# Patient Record
Sex: FEMALE | Race: WHITE | NOT HISPANIC OR LATINO | URBAN - NONMETROPOLITAN AREA
[De-identification: names, ages, dates, MRNs, and addresses within clinical notes are randomized per-mention and may not be internally consistent; named-entity substitution may affect disease eponyms.]

---

## 2017-05-22 PROBLEM — Z96.1: Noted: 2017-05-22

## 2017-05-22 PROBLEM — H52.4: Noted: 2017-05-22

## 2019-07-12 ENCOUNTER — IMPORTED ENCOUNTER (OUTPATIENT)
Dept: URBAN - NONMETROPOLITAN AREA CLINIC 1 | Facility: CLINIC | Age: 74
End: 2019-07-12

## 2019-07-12 PROCEDURE — 92014 COMPRE OPH EXAM EST PT 1/>: CPT

## 2019-07-12 PROCEDURE — 92015 DETERMINE REFRACTIVE STATE: CPT

## 2019-07-12 NOTE — PATIENT DISCUSSION
PVD OU Discussed findings of exam in detail with the patient. The risk of retinal detachment in patients with PVDs was discussed with the patient and the warning signs of retinal detachment were carefully reviewed with the patient. The patient was warned to return to the office or contact the ophthalmologist on call immediately if they experience signs of retinal detachment. Continue to monitor. Pseudophakia OUDiscussed diagnosis in detail with patient. Both intraocular implants in place and stable. Continue to monitor. Increased IOP OSDiscussed diagnosis in detail with patient. IOP at 26 OS. RTC in 1 month for IOP check Presbyopia OUDicussed refractive status in detail with patient. New glasses Rx given today. Continue to monitor.; 's Notes: MR 7/12/19DFE 7/12/19

## 2019-08-16 ENCOUNTER — IMPORTED ENCOUNTER (OUTPATIENT)
Dept: URBAN - NONMETROPOLITAN AREA CLINIC 1 | Facility: CLINIC | Age: 74
End: 2019-08-16

## 2019-08-16 PROBLEM — Z96.1: Noted: 2019-08-16

## 2019-08-16 PROBLEM — H52.4: Noted: 2019-08-16

## 2019-08-16 PROBLEM — H04.123: Noted: 2019-08-16

## 2019-08-16 PROBLEM — H43.813: Noted: 2019-08-16

## 2019-08-16 PROCEDURE — 92012 INTRM OPH EXAM EST PATIENT: CPT

## 2019-08-16 NOTE — PATIENT DISCUSSION
GEORGETTE OUDiscussed diagnosis in detail with patient. 1+SPK noted OU. Start Refresh BID-QID OU samples given today. Continue to monitor. PVD OU Discussed findings of exam in detail with the patient. The risk of retinal detachment in patients with PVDs was discussed with the patient and the warning signs of retinal detachment were carefully reviewed with the patient. The patient was warned to return to the office or contact the ophthalmologist on call immediately if they experience signs of retinal detachment. Continue to monitor. Pseudophakia OUDiscussed diagnosis in detail with patient. Both intraocular implants in place and stable. Continue to monitor. Increased IOP OSDiscussed diagnosis in detail with patient. IOP better at 19 OS todayPresbyopia OUDicussed refractive status in detail with patient. Continue to monitor.; 's Notes: MR 7/12/19DFE 7/12/19

## 2020-08-21 ENCOUNTER — IMPORTED ENCOUNTER (OUTPATIENT)
Dept: URBAN - NONMETROPOLITAN AREA CLINIC 1 | Facility: CLINIC | Age: 75
End: 2020-08-21

## 2020-08-21 PROBLEM — H43.813: Noted: 2020-08-21

## 2020-08-21 PROBLEM — Z96.1: Noted: 2019-08-16

## 2020-08-21 PROBLEM — H04.123: Noted: 2020-08-21

## 2020-08-21 PROBLEM — H52.4: Noted: 2019-08-16

## 2020-08-21 PROCEDURE — 92014 COMPRE OPH EXAM EST PT 1/>: CPT

## 2020-08-21 PROCEDURE — 92250 FUNDUS PHOTOGRAPHY W/I&R: CPT

## 2020-08-21 PROCEDURE — 92015 DETERMINE REFRACTIVE STATE: CPT

## 2020-08-21 NOTE — PATIENT DISCUSSION
PVD OU Discussed findings of exam in detail with the patient. The risk of retinal detachment in patients with PVDs was discussed with the patient and the warning signs of retinal detachment were carefully reviewed with the patient. The patient was warned to return to the office or contact the ophthalmologist on call immediately if they experience signs of retinal detachment. Optos done today; no holes tears or detachments OU. Continue to monitor. GEORGETTE OUDiscussed diagnosis in detail with patient. Trace SPK noted OU. Start Refresh Relieva PRN samples given today. Continue to monitor. Pseudophakia OUDiscussed diagnosis in detail with patient. Both intraocular implants in place and stable. Continue to monitor. Presbyopia OUDicussed refractive status in detail with patient. New glassses Rx given today. Continue to monitor.; 's Notes: MR 8/21/20DFE 7/12/19OPTOS 8/21/20

## 2021-08-23 ENCOUNTER — IMPORTED ENCOUNTER (OUTPATIENT)
Dept: URBAN - NONMETROPOLITAN AREA CLINIC 1 | Facility: CLINIC | Age: 76
End: 2021-08-23

## 2021-08-23 PROCEDURE — 99214 OFFICE O/P EST MOD 30 MIN: CPT

## 2021-08-23 PROCEDURE — 92015 DETERMINE REFRACTIVE STATE: CPT

## 2021-08-23 PROCEDURE — 92250 FUNDUS PHOTOGRAPHY W/I&R: CPT

## 2021-08-23 NOTE — PATIENT DISCUSSION
PVD OU Discussed findings of exam in detail with the patient. The risk of retinal detachment in patients with PVDs was discussed with the patient and the warning signs of retinal detachment were carefully reviewed with the patient. The patient was warned to return to the office or contact the ophthalmologist on call immediately if they experience signs of retinal detachment. Optos done today; no holes tears or detachments OU. Continue to monitor. GEORGETTE OUDiscussed diagnosis in detail with patient. Trace SPK noted OU. Continue Refresh Relieva PRN samples given today. Continue to monitor. Pseudophakia OUDiscussed diagnosis in detail with patient. Both intraocular implants in place and stable. Continue to monitor. Presbyopia OUDicussed refractive status in detail with patient. New glassses Rx given today. Continue to monitor.; 's Notes: MR 8/21/20DFE 7/12/19OPTOS 8/21/20

## 2022-04-15 ASSESSMENT — VISUAL ACUITY
OS_CC: 20/20-2
OS_SC: 20/30
OS_SC: 20/25-2
OD_CC: J1+
OD_SC: 20/25-2
OD_SC: 20/30-
OD_CC: 20/25-2
OS_CC: J1+
OD_CC: 20/30-1
OS_CC: 20/40-1

## 2022-04-15 ASSESSMENT — TONOMETRY
OD_IOP_MMHG: 17
OS_IOP_MMHG: 16
OS_IOP_MMHG: 26
OD_IOP_MMHG: 16
OS_IOP_MMHG: 19
OS_IOP_MMHG: 16

## 2022-09-08 ENCOUNTER — FOLLOW UP (OUTPATIENT)
Dept: URBAN - NONMETROPOLITAN AREA CLINIC 1 | Facility: CLINIC | Age: 77
End: 2022-09-08

## 2022-09-08 DIAGNOSIS — H04.123: ICD-10-CM

## 2022-09-08 DIAGNOSIS — H43.813: ICD-10-CM

## 2022-09-08 DIAGNOSIS — H52.4: ICD-10-CM

## 2022-09-08 PROCEDURE — 99214 OFFICE O/P EST MOD 30 MIN: CPT

## 2022-09-08 PROCEDURE — 92015 DETERMINE REFRACTIVE STATE: CPT

## 2022-09-08 PROCEDURE — 92250 FUNDUS PHOTOGRAPHY W/I&R: CPT

## 2022-09-08 ASSESSMENT — TONOMETRY
OS_IOP_MMHG: 16
OD_IOP_MMHG: 16

## 2022-09-08 ASSESSMENT — VISUAL ACUITY
OD_CC: 20/22-1
OS_CC: 20/32

## 2023-09-11 ENCOUNTER — ESTABLISHED PATIENT (OUTPATIENT)
Dept: URBAN - NONMETROPOLITAN AREA CLINIC 1 | Facility: CLINIC | Age: 78
End: 2023-09-11

## 2023-09-11 DIAGNOSIS — H04.123: ICD-10-CM

## 2023-09-11 DIAGNOSIS — H43.813: ICD-10-CM

## 2023-09-11 PROCEDURE — 99214 OFFICE O/P EST MOD 30 MIN: CPT

## 2023-09-11 PROCEDURE — 92250 FUNDUS PHOTOGRAPHY W/I&R: CPT

## 2023-09-11 ASSESSMENT — TONOMETRY
OS_IOP_MMHG: 17
OD_IOP_MMHG: 17

## 2023-09-11 ASSESSMENT — VISUAL ACUITY
OU_CC: 20/25
OD_CC: 20/30

## 2024-07-08 ENCOUNTER — EMERGENCY VISIT (OUTPATIENT)
Dept: URBAN - NONMETROPOLITAN AREA CLINIC 1 | Facility: CLINIC | Age: 79
End: 2024-07-08

## 2024-07-08 DIAGNOSIS — H20.012: ICD-10-CM

## 2024-07-08 PROCEDURE — 99213 OFFICE O/P EST LOW 20 MIN: CPT

## 2024-07-08 ASSESSMENT — VISUAL ACUITY
OS_SC: 20/200
OD_SC: 20/60
OS_PH: 20/40
OD_PH: 20/25

## 2024-07-08 ASSESSMENT — TONOMETRY
OS_IOP_MMHG: 22
OD_IOP_MMHG: 17
OD_IOP_MMHG: 19
OS_IOP_MMHG: 26

## 2024-07-15 ENCOUNTER — FOLLOW UP (OUTPATIENT)
Dept: URBAN - NONMETROPOLITAN AREA CLINIC 1 | Facility: CLINIC | Age: 79
End: 2024-07-15

## 2024-07-15 DIAGNOSIS — H20.012: ICD-10-CM

## 2024-07-15 PROCEDURE — 99213 OFFICE O/P EST LOW 20 MIN: CPT

## 2024-07-15 ASSESSMENT — VISUAL ACUITY
OD_CC: 20/30
OS_CC: 20/40
OS_PH: 20/25-1

## 2024-07-15 ASSESSMENT — TONOMETRY
OD_IOP_MMHG: 19
OS_IOP_MMHG: 22

## 2024-07-26 ENCOUNTER — EMERGENCY VISIT (OUTPATIENT)
Dept: URBAN - NONMETROPOLITAN AREA CLINIC 1 | Facility: CLINIC | Age: 79
End: 2024-07-26

## 2024-07-26 DIAGNOSIS — H10.423: ICD-10-CM

## 2024-07-26 PROCEDURE — 99213 OFFICE O/P EST LOW 20 MIN: CPT

## 2024-07-26 ASSESSMENT — VISUAL ACUITY
OS_CC: 20/30-2
OD_CC: 20/30-2
OU_CC: 20/25

## 2024-07-26 ASSESSMENT — TONOMETRY
OD_IOP_MMHG: 16
OS_IOP_MMHG: 22

## 2024-09-12 ENCOUNTER — COMPREHENSIVE EXAM (OUTPATIENT)
Dept: URBAN - NONMETROPOLITAN AREA CLINIC 1 | Facility: CLINIC | Age: 79
End: 2024-09-12

## 2024-09-12 DIAGNOSIS — H10.423: ICD-10-CM

## 2024-09-12 DIAGNOSIS — H04.123: ICD-10-CM

## 2024-09-12 DIAGNOSIS — H52.4: ICD-10-CM

## 2024-09-12 DIAGNOSIS — H43.813: ICD-10-CM

## 2024-09-12 DIAGNOSIS — Z96.1: ICD-10-CM

## 2024-09-12 PROCEDURE — 92014 COMPRE OPH EXAM EST PT 1/>: CPT

## 2024-09-12 PROCEDURE — 92250 FUNDUS PHOTOGRAPHY W/I&R: CPT

## 2024-09-12 PROCEDURE — 92015 DETERMINE REFRACTIVE STATE: CPT
